# Patient Record
(demographics unavailable — no encounter records)

---

## 2024-12-17 NOTE — ASSESSMENT
[FreeTextEntry1] : 68 y.o. M with the following:  #Screening PSA - Repeat PSA today.  JOSE R negative  #ED - Sildenafil renewed. Also prescribed tadalafil. Can try both - reviewed should NOT use at same time - to see which he prefers  #Nocturia / BPH - UA - Minimally bothered  RPA 6 months  ADDENDUM PSA 4.68 plan MRI prostate d/w patient

## 2024-12-17 NOTE — HISTORY OF PRESENT ILLNESS
[FreeTextEntry1] : 68 year-old male who presents for follow-up:  #ED: History of pituitary adenoma status post-surgery and radiation.  Testosterone within normal limits.  On sildenafil with good response Also interested in cialis   #Screening PSA: April 1, 2024: 3.26 8/29/2022: 3.10 11/30/2021: 2.54 8/4/2018: 1.70 11/20/2014: 3.74 He has never undergone a prostate biopsy in the past. No family history of prostate cancer  #LUTS Nocturia x 1 No gross hematuria.  One episode of dysuria 3-4 months ago Not bothered by urination at all No UTIs, retention episodes, bladder stones.

## 2024-12-17 NOTE — PHYSICAL EXAM
[Normal Appearance] : normal appearance [Edema] : no peripheral edema [Bowel Sounds] : normal bowel sounds [Abdomen Tenderness] : non-tender [Urethral Meatus] : meatus normal [Epididymis] : the epididymides were normal [Testes Tenderness] : no tenderness of the testes [Testes Mass (___cm)] : there were no testicular masses [Prostate Tenderness] : the prostate was not tender [Prostate Enlargement] : the prostate was not enlarged [No Prostate Nodules] : no prostate nodules [] : no rash [Chaperone Present] : A chaperone was present in the examining room during all aspects of the physical examination [FreeTextEntry2] : Yoly SEWELL

## 2025-03-10 NOTE — HISTORY OF PRESENT ILLNESS
[FreeTextEntry1] : 68 year-old male who presents for follow-up:  #ED: History of pituitary adenoma status post-surgery and radiation.  Testosterone within normal limits.  On tadalafil with good response  #Elevated PSA: December 17, 2024: 4.68 April 1, 2024: 3.26 8/29/2022: 3.10 11/30/2021: 2.54 8/4/2018: 1.70 11/20/2014: 3.74 He has never undergone a prostate biopsy in the past. No family history of prostate cancer MRI Prostate 2/2025 - 60cc prostate, PIRADS 4  #LUTS Nocturia x 1 No gross hematuria.  Some dysuria No UTIs, retention episodes, bladder stones.

## 2025-03-10 NOTE — ASSESSMENT
[FreeTextEntry1] : 68 y.o. M with the following:  #Elevated PSA, abnormal MRI - MRI reviewed - I discussed that the definitive way to evaluate for prostate cancer is to perform a prostate biopsy.  I described the procedure in detail.  I described that this involves placing a probe into the rectum, and then placing a biopsy needle through the skin between the scrotum and the anus (the perineum).  I discussed that we usually sample 3-4 samples from the IMER and 12 different regions in the prostate.  I described the risks of the procedure, including retention, pain, infection.  I described that while infection after prostate biopsy is a significant concern, using the transperineal technique decreases this risk. Also discussed the possibility of transient-worsening of his erections following this biopsy. At this time, he would like to proceed with a transperineal prostate biopsy - Schedule for TP prostate biopsy - offered 3/26/25 - patient reports that he is leaving country in 2 weeks. Will reach out to [facility] to inquire about earlier appointment   #ED - Tadalafil prn  #Dysuria - UA, UCx

## 2025-07-14 NOTE — ASSESSMENT
[FreeTextEntry1] : 68 y.o. M with the following:  #Elevated PSA, abnormal MRI, HGPIN - s/p biopsy reviewed results with patient's, high-grade PIN -PSA today   #ED - Tadalafil prn  #BPH, LUTS - PVR 0 mL - Content w/ urination - UCx  f/u 6 months

## 2025-07-14 NOTE — PHYSICAL EXAM
[Normal Appearance] : normal appearance [Edema] : no peripheral edema [Exaggerated Use Of Accessory Muscles For Inspiration] : no accessory muscle use [Abdomen Tenderness] : non-tender [Costovertebral Angle Tenderness] : no ~M costovertebral angle tenderness [Urethral Meatus] : meatus normal [Epididymis] : the epididymides were normal [Testes Tenderness] : no tenderness of the testes [Testes Mass (___cm)] : there were no testicular masses [Prostate Enlargement] : the prostate was not enlarged [Prostate Tenderness] : the prostate was not tender [No Prostate Nodules] : no prostate nodules

## 2025-07-14 NOTE — HISTORY OF PRESENT ILLNESS
[FreeTextEntry1] : 68 year-old male who presents for follow-up:  #ED: History of pituitary adenoma status post-surgery and radiation.  Testosterone within normal limits.  On tadalafil with good response  #Elevated PSA: December 17, 2024: 4.68 April 1, 2024: 3.26 8/29/2022: 3.10 11/30/2021: 2.54 8/4/2018: 1.70 11/20/2014: 3.74 He has never undergone a prostate biopsy in the past. No family history of prostate cancer MRI Prostate 2/2025 - 60cc prostate, PIRADS 4 Underwent transperineal prostate biopsy with Dr. Ornelas March 2025: High-grade PIN in left lateral, left anterior prostate, otherwise benign prostatic tissue  #LUTS Nocturia x 1 No gross hematuria.  Some dysuria No UTIs, retention episodes, bladder stones.